# Patient Record
Sex: MALE | Race: WHITE | NOT HISPANIC OR LATINO | URBAN - METROPOLITAN AREA
[De-identification: names, ages, dates, MRNs, and addresses within clinical notes are randomized per-mention and may not be internally consistent; named-entity substitution may affect disease eponyms.]

---

## 2018-12-03 ENCOUNTER — OFFICE VISIT (OUTPATIENT)
Dept: URBAN - METROPOLITAN AREA CLINIC 16 | Facility: CLINIC | Age: 83
End: 2018-12-03
Payer: MEDICARE

## 2018-12-03 DIAGNOSIS — H35.3131 NONEXUDATIVE AGE-RELATED MACULAR DEGENERATION, BILATERAL, EARLY DRY STAGE: ICD-10-CM

## 2018-12-03 DIAGNOSIS — Z96.1 PRESENCE OF PSEUDOPHAKIA: Primary | ICD-10-CM

## 2018-12-03 DIAGNOSIS — H10.45 OTHER CHRONIC ALLERGIC CONJUNCTIVITIS: ICD-10-CM

## 2018-12-03 PROCEDURE — 92004 COMPRE OPH EXAM NEW PT 1/>: CPT | Performed by: OPTOMETRIST

## 2018-12-03 RX ORDER — OLOPATADINE HYDROCHLORIDE 7 MG/ML
0.7 % SOLUTION OPHTHALMIC
Qty: 0 | Refills: 0 | Status: ACTIVE
Start: 2018-12-03

## 2018-12-03 ASSESSMENT — INTRAOCULAR PRESSURE
OS: 12
OD: 12

## 2018-12-03 NOTE — IMPRESSION/PLAN
Impression: Other chronic allergic conjunctivitis: H10.45. Plan: Trial [place] given. for us qd PRN.  Can have Rx

## 2018-12-03 NOTE — IMPRESSION/PLAN
Impression: Nonexudative age-related macular degeneration, bilateral, early dry stage: H35.3131. Plan: Discussed diagnosis. Will continue to observe. Recommend vitamins.  Recommend refraction in future

## 2019-01-09 ENCOUNTER — OFFICE VISIT (OUTPATIENT)
Dept: URBAN - METROPOLITAN AREA CLINIC 16 | Facility: CLINIC | Age: 84
End: 2019-01-09
Payer: COMMERCIAL

## 2019-01-09 PROCEDURE — 92015 DETERMINE REFRACTIVE STATE: CPT | Performed by: OPTOMETRIST

## 2019-01-09 PROCEDURE — 92012 INTRM OPH EXAM EST PATIENT: CPT | Performed by: OPTOMETRIST

## 2019-01-09 ASSESSMENT — VISUAL ACUITY
OD: 20/40
OS: 20/40

## 2020-03-18 ENCOUNTER — OFFICE VISIT (OUTPATIENT)
Dept: URBAN - METROPOLITAN AREA CLINIC 16 | Facility: CLINIC | Age: 85
End: 2020-03-18
Payer: MEDICARE

## 2020-03-18 PROCEDURE — 92014 COMPRE OPH EXAM EST PT 1/>: CPT | Performed by: OPTOMETRIST

## 2020-03-18 ASSESSMENT — INTRAOCULAR PRESSURE
OS: 15
OD: 15

## 2020-03-18 NOTE — IMPRESSION/PLAN
Impression: Nonexudative age-related macular degeneration, bilateral, early dry stage: H35.3131. Plan: Discussed diagnosis in detail with patient. No treatment is required at this time. Use of vitamins has shown to improve the effects of ARMD. Recommend AREDS 2 formula. Wear quality sunglasses and monitor vision at home. Call the office for an immediate appointment if [address] worsens.

## 2020-06-15 ENCOUNTER — TESTING ONLY (OUTPATIENT)
Dept: URBAN - METROPOLITAN AREA CLINIC 16 | Facility: CLINIC | Age: 85
End: 2020-06-15
Payer: COMMERCIAL

## 2020-06-15 DIAGNOSIS — H52.223 REGULAR ASTIGMATISM, BILATERAL: Primary | ICD-10-CM

## 2020-06-15 DIAGNOSIS — H35.3130 NONEXUDATIVE AGE-RELATED MACULAR DEGENERATION, BILATERAL, STAGE UNSPECIFIED: ICD-10-CM

## 2020-06-15 PROCEDURE — 92012 INTRM OPH EXAM EST PATIENT: CPT | Performed by: OPTOMETRIST

## 2020-06-15 ASSESSMENT — VISUAL ACUITY
OS: 20/60
OD: 20/40

## 2020-06-15 NOTE — IMPRESSION/PLAN
Impression: Nonexudative age-related macular degeneration, bilateral, stage unspecified: H35.3130. Plan: Discussed diagnosis in detail with patient. No treatment is required at this time. Use of vitamins has shown to improve the effects of ARMD. Recommend AREDS 2 formula. Wear quality sunglasses and monitor vision at home. Call the office for an immediate appointment if [address] worsens.